# Patient Record
Sex: FEMALE
[De-identification: names, ages, dates, MRNs, and addresses within clinical notes are randomized per-mention and may not be internally consistent; named-entity substitution may affect disease eponyms.]

---

## 2018-03-20 ENCOUNTER — HOSPITAL ENCOUNTER (EMERGENCY)
Dept: HOSPITAL 25 - UCCORT | Age: 73
Discharge: HOME | End: 2018-03-20
Payer: MEDICARE

## 2018-03-20 VITALS — SYSTOLIC BLOOD PRESSURE: 131 MMHG | DIASTOLIC BLOOD PRESSURE: 78 MMHG

## 2018-03-20 DIAGNOSIS — J40: Primary | ICD-10-CM

## 2018-03-20 PROCEDURE — 99211 OFF/OP EST MAY X REQ PHY/QHP: CPT

## 2018-03-20 PROCEDURE — G0463 HOSPITAL OUTPT CLINIC VISIT: HCPCS

## 2018-03-20 PROCEDURE — 71046 X-RAY EXAM CHEST 2 VIEWS: CPT

## 2018-03-20 NOTE — UC
Respiratory Complaint HPI





- HPI Summary


HPI Summary: 





COUGH X 3 DAYS


COUGH IS DRY , HARSH 


+ NASAL CONGESTION , PND, NO FEVER, NO CHILLS





- History of Current Complaint


Chief Complaint: UCGeneralIllness


Stated Complaint: COUGH,CONGESTION


Time Seen by Provider: 03/20/18 13:00


Hx Obtained From: Patient


Onset/Duration: Gradual Onset, Lasting Days - 3, Still Present


Timing: Constant


Severity Initially: Moderate


Severity Currently: Moderate


Pain Intensity: 0


Character: Cough: Nonproductive


Aggravating Factors: Exertion, Deep Breaths


Alleviating Factors: Nothing


Associated Signs And Symptoms: Positive: URI, Nasal Congestion.  Negative: 

Dyspnea, Fever, Chills, Pleuritic Chest Pain, Wheezing, Hemoptysis, Dizziness, 

Calf Pain, Calf Swelling, Edema, Hoarseness





- Allergies/Home Medications


Allergies/Adverse Reactions: 


 Allergies











Allergy/AdvReac Type Severity Reaction Status Date / Time


 


No Known Allergies Allergy   Verified 11/21/16 06:34











Home Medications: 


 Home Medications





D-Methorphan/PE/Acetaminophen [Day Time Cold-Flu Liquid]  03/20/18 [History]











PMH/Surg Hx/FS Hx/Imm Hx





- Additional Past Medical History


Additional PMH: 





ARTHITIS


SCHOLIOSIS


Respiratory History: Asthma





- Surgical History


Surgical History: Yes


Surgery Procedure, Year, and Place: BUNIONECTOMY, RIGHT, 2011, [facility].  

APPENDECTOMY, 1955, [facility].  HYSTERECTOMY, 1999, [facility].  RIGHT KNEE 

REPLACMENT.  HIP REPLACEMENT





- Family History


Known Family History: 


   Negative: Diabetes





- Social History


Alcohol Use: Occasionally


Alcohol Amount: 3 DRINKS A WEEK


Substance Use Type: None


Smoking Status (MU): Never Smoked Tobacco


Amount Used/How Often: PACK A DAY


Have You Smoked in the Last Year: No


When Did the Patient Quit Smoking/Using Tobacco: 38 YEARS AGO





- Immunization History


Most Recent Influenza Vaccination: September 2015


Most Recent Tetanus Shot: unknown


Most Recent Pneumonia Vaccination: 2016





Review of Systems


Constitutional: Negative


Skin: Negative


Eyes: Negative


ENT: Nasal Discharge


Respiratory: Cough


Cardiovascular: Negative


Gastrointestinal: Negative


Is Patient Immunocompromised?: No


All Other Systems Reviewed And Are Negative: Yes





Physical Exam


Triage Information Reviewed: Yes


Appearance: Well-Appearing, No Pain Distress, Well-Nourished


Vital Signs: 


 Initial Vital Signs











Temp  99.2 F   03/20/18 12:42


 


Pulse  73   03/20/18 12:42


 


Resp  18   03/20/18 12:42


 


BP  131/78   03/20/18 12:42


 


Pulse Ox  98   03/20/18 12:42











Vital Signs Reviewed: Yes


Eye Exam: Normal


Eyes: Positive: Conjunctiva Clear


ENT: Positive: Normal ENT inspection, Hearing grossly normal, Pharynx normal, 

Nasal congestion, TMs normal


Neck: Positive: Supple, Nontender, No Lymphadenopathy


Respiratory: Positive: Chest non-tender, Lungs clear, Normal breath sounds, No 

respiratory distress


Cardiovascular: Positive: RRR, No Murmur, Pulses Normal


Skin Exam: Normal





UC Diagnostic Evaluation





- Laboratory


O2 Sat by Pulse Oximetry: 98





Respiratory Course/Dx





- Differential Dx/Diagnosis


Provider Diagnoses: BRONCHITIS





Discharge





- Sign-Out/Discharge


Documenting (check all that apply): Discharge





- Discharge Plan


Condition: Stable


Disposition: HOME


Prescriptions: 


Benzonatate CAP* [Tessalon 100 MG CAP*] 100 mg PO TID PRN #15 cap


 PRN Reason: Cough


Patient Education Materials:  Acute Bronchitis (ED)


Referrals: 


Reji Reyes DO [Primary Care Provider] - 7 Days





- Billing Disposition and Condition


Condition: STABLE


Disposition: HOME

## 2018-03-20 NOTE — RAD
HISTORY: Cough



COMPARISONS: November 11, 2016



VIEWS: 4: Frontal dual-energy and lateral views of the chest.



FINDINGS:

CARDIOMEDIASTINAL SILHOUETTE: The cardiomediastinal silhouette is normal.

SORIN: The sorin are normal.

PLEURA: The costophrenic angles are sharp. No pleural abnormalities are noted.

LUNG PARENCHYMA: There is patchy alveolar opacification of the right perihilar region.

ABDOMEN: The upper abdomen is clear. There is no subphrenic gas.

BONES AND SOFT TISSUES: There is a scoliotic curvature of the spine. Degenerative changes

are noted.

OTHER: None.



IMPRESSION:

PATCHY PERIHILAR AIRSPACE DISEASE ON THE RIGHT. RECOMMEND FOLLOW-UP UNTIL RESOLUTION TO

EXCLUDE UNDERLYING PULMONARY PARENCHYMAL PATHOLOGY.

## 2019-08-12 ENCOUNTER — HOSPITAL ENCOUNTER (OUTPATIENT)
Dept: HOSPITAL 25 - OR | Age: 74
Discharge: HOME | End: 2019-08-12
Attending: ORTHOPAEDIC SURGERY
Payer: MEDICARE

## 2019-08-12 VITALS — DIASTOLIC BLOOD PRESSURE: 71 MMHG | SYSTOLIC BLOOD PRESSURE: 153 MMHG

## 2019-08-12 DIAGNOSIS — M20.22: ICD-10-CM

## 2019-08-12 DIAGNOSIS — M19.072: Primary | ICD-10-CM

## 2019-08-12 PROCEDURE — C1713 ANCHOR/SCREW BN/BN,TIS/BN: HCPCS

## 2019-08-12 PROCEDURE — 76000 FLUOROSCOPY <1 HR PHYS/QHP: CPT

## 2019-08-12 NOTE — OP
DATE OF OPERATION:  08/12/19 [facility]

 

DATE OF BIRTH:  [birth year]

 

ATTENDING SURGEON:  Trey Amin MD.

 

ASSISTANT:  Mary Galicia PA-C.

 

PRE-OP DIAGNOSIS:  Arthritic left first metatarsophalangeal joint.

 

POST-OP DIAGNOSIS:  Arthritic left first metatarsophalangeal joint.

 

OPERATIVE PROCEDURE:  Fusion, left first MTP joint, using Arthrex 
instrumentation.

 

DESCRIPTION OF PROCEDURE:  The patient was taken to the operating room, where 
ankle Esmarch was applied as well as local anesthetic around the first 
metatarsal.  We made a longitudinal incision over the first MTP joint medial to 
the EHL tendon. The capsule was then opened medially and laterally to allow 
full visualization of the joint surfaces.  These were prepared for arthrodesis 
using the ball and cup reamers from the Arthrex system, 20 mm wide.  We then 
apposed the two surfaces in the neutral position and pinned this obliquely with 
the 1.5 mm guide pin.  We exchanged this for a 

34 mm partially threaded 3.0 cannulated screw in a compression mode.  A left 
medium standard fusion plate was then fashioned to fit the dorsal aspect of the 
first MTP joint.  This was fixed with dorsal to plantar combination of locking 
and nonlocking screws.  X-rays intraoperatively showed satisfactory position 
and alignment.  We irrigated and closed with 

3-0 Monocryl; 3-0 nylon and compression dressing applied.

 

 [id number]/[facility] #: [id number]

[facility]